# Patient Record
Sex: FEMALE | Race: WHITE | Employment: FULL TIME | URBAN - METROPOLITAN AREA
[De-identification: names, ages, dates, MRNs, and addresses within clinical notes are randomized per-mention and may not be internally consistent; named-entity substitution may affect disease eponyms.]

---

## 2019-08-23 ENCOUNTER — OFFICE VISIT (OUTPATIENT)
Dept: URGENT CARE | Facility: URGENT CARE | Age: 29
End: 2019-08-23
Payer: COMMERCIAL

## 2019-08-23 VITALS
SYSTOLIC BLOOD PRESSURE: 110 MMHG | RESPIRATION RATE: 16 BRPM | HEART RATE: 68 BPM | DIASTOLIC BLOOD PRESSURE: 70 MMHG | TEMPERATURE: 98.7 F | WEIGHT: 156.38 LBS

## 2019-08-23 DIAGNOSIS — M67.431 GANGLION CYST OF WRIST, RIGHT: Primary | ICD-10-CM

## 2019-08-23 PROCEDURE — 10060 I&D ABSCESS SIMPLE/SINGLE: CPT | Performed by: NURSE PRACTITIONER

## 2019-08-23 RX ORDER — NORGESTIMATE AND ETHINYL ESTRADIOL 7DAYSX3 LO
1 KIT ORAL
COMMUNITY
Start: 2019-05-09

## 2019-08-24 NOTE — PATIENT INSTRUCTIONS
Patient Education     Ganglion Cyst    A ganglion cyst usually is a painless bump on the wrist or finger joint. It connects to the joint capsule and grows like a balloon on a stalk. It is filled with joint fluid. The cause of a ganglion cyst is not known.   If the cyst puts pressure on a nearby nerve it may cause pain. Otherwise, cysts are painless and harmless. Most tend to disappear over time without treatment. Don't try to drain or break the cyst at home. This can cause harm and usually does not cure the problem.  If you are having pain from the cyst, a temporary wrist splint may be helpful to limit wrist motion. If this does not help, the fluid can be removed from the cyst. This should shrink the size of the cyst. If this doesn t give relief, the ganglion can be removed by surgery.  Home care    If you are having wrist pain, use a wrist splint for 1 to 2 weeks at a time. You can buy one at many drug stores without a prescription.    You may use over-the-counter pain medicine to control pain, unless another medicine was prescribed. If you have chronic liver or kidney disease or ever had a stomach ulcer or gastrointestinal bleeding, talk with your healthcare provider before using these medicines.      If a needle was used to drain the cyst fluid or inject medicine into it, keep the site clean and covered with a bandage for the first 24 hours. If a pressure dressing was applied, leave it in place for the time advised.  Follow-up care  Follow up with your healthcare provider, or as advised. Make an appointment for a repeat exam if pain continues for more than 2 weeks in a wrist splint.  When to seek medical advice  Call your healthcare provider right away if any of these occur:    Increasing pain in the wrist    Redness or warmth over the cyst    Fluid draining from the cyst    Numbness or tingling in the hand or arm  Date Last Reviewed: 5/1/2018 2000-2018 [employer]. [address] [facility],  [place] [address]. All rights reserved. This information is not intended as a substitute for professional medical care. Always follow your healthcare professional's instructions.           Patient Education     Quiste Ganglionar [Ganglion Cyst]    Un quiste ganglionar generalmente aparece kelli purnima protuberancia sin dolor en la patric (y algunas veces en las articulaciones de los dedos). Éste se conecta con la cápsula de la articulación y crece kelli un globo en un tallo. Se llena con líquido de la articulación. La causa del quiste de ganglio no se conoce.  Si el quiste presiona sobre un nervio cercano puede provocar dolor. En miya contrario, no duele ni hace daño. La mayoría tiende a desparecer con el tiempo sin ningún tratamiento. No intente drenar o reventar un quiste en quick casa. Tensed puede causar un daño y no soluciona el problema.  Si el quiste le está provocando dolor, purnima férula de patric temporal puede ayudarle para limitar el movimiento de la patric. Si esto no ayuda, se puede quitar el líquido del quiste haciendo que el mismo disminuya en tamaño. Si esto todavía no jimenez, el ganglio se puede remover con cirugía.  Cuidado En [place]  1. Si está con dolor el la patric pruebe a usar purnima muñequera de Velcro andrzej 1-2 semanas seguidas (disponible en cualquier farmacia sin receta).  2. Puede usar acetaminofén (Tylenol) o ibuprofeno (Motrin, Advil) para controlar el dolor, a menos que le receten otro medicamento para el dolor. [NOTA: Si sufre de enfermedad del hígado o riñones, o alguna vez tuvo purnima úlcera estomacal o sangrado gastrointestinal, hable con quick médico antes de usar estos medicamentos.]  3. Si se utilizó purnima aguja para drenar el líquido del quiste o inyectar un medicamento, mantenga el sitio limpio y cubierto por purnima curita andrzej las primeras 24 horas. Si se aplicó un vendaje de presión, déjeselo puesto andrzej el tiempo que le indicaron.  Seguimiento  con quick médico de acuerdo a lo indicado por  nuestro personal. Programe purnima marco antonio para exámenes de control si quick dolor continúa por más de 2 semanas aún usando purnima muñequera.  Busque Prontamente Atención Médica  si algo de lo siguiente ocurre:    Aumento en el dolor en la patric    Enrojecimiento del quiste    Líquido que sale del quiste    Admormecimiento u hormigueo en la mano o el brazo  Date Last Reviewed: 11/20/2015 2000-2018 [employer]. [address] [place] [id number]. Todos los derechos reservados. Esta información no pretende sustituir la atención médica profesional. Sólo quick médico puede diagnosticar y tratar un problema de beau.

## 2019-08-24 NOTE — PROGRESS NOTES
SUBJECTIVE  HPI:  lAisson Duckworth is a 28 year old female who presents with the CC of right wrist bump with associated pain.  This started in the beginning of August.  For the past 2 weeks the bump started growing and became more painful.  Denies fever, chills.  Wrist does feel slightly numb. No history of this in the past.  Otherwise feeling well.    No past medical history on file.  Current Outpatient Medications   Medication Sig Dispense Refill     norgestim-eth estrad triphasic (ORTHO TRI-CYCLEN LO) 0.18/0.215/0.25 MG-25 MCG tablet Take 1 tablet by mouth       Social History     Tobacco Use     Smoking status: Never Smoker     Smokeless tobacco: Never Used   Substance Use Topics     Alcohol use: Not on file       ROS:  Review of systems negative except as stated above.    OBJECTIVE:  /70 (Cuff Size: Adult Regular)   Pulse 68   Temp 98.7  F (37.1  C) (Oral)   Resp 16   Wt 70.9 kg (156 lb 6 oz)   GENERAL APPEARANCE: healthy, alert and no distress  ABDOMEN:  soft, nontender, no HSM or masses and bowel sounds normal  NEURO: Normal strength and tone, sensory exam grossly normal,  normal speech and mentation  SKIN: 1 cm ganglion cyst located on right palmar medial wrist, no erythema, tender to touch.  No drainage       Affected area cleaned with betadine x 3.  18g needle used to aspirate 0.5 ml of gelatinous fluid.  Gauze with pressure applied and bleeding stopped, patient felt pain relief. Appropriate wound care dressing applied.  Pt tolerated procedure well.    ASSESSMENT:  Ganglion cyst of right wrist    PLAN:  -needle aspiration done with relief of pain  -recommend she follow up with PCP  -return if worsening  -leave dressing on for 24 hours, then  remove      See Orders in Epic    Susan Ramírez, APRN, CNP

## 2019-11-10 ENCOUNTER — OFFICE VISIT (OUTPATIENT)
Dept: URGENT CARE | Facility: URGENT CARE | Age: 29
End: 2019-11-10
Payer: COMMERCIAL

## 2019-11-10 VITALS
HEART RATE: 74 BPM | RESPIRATION RATE: 20 BRPM | DIASTOLIC BLOOD PRESSURE: 46 MMHG | TEMPERATURE: 98.3 F | SYSTOLIC BLOOD PRESSURE: 120 MMHG | WEIGHT: 158 LBS

## 2019-11-10 DIAGNOSIS — R10.31 RIGHT LOWER QUADRANT PAIN: Primary | ICD-10-CM

## 2019-11-10 PROBLEM — H52.13 MYOPIA OF BOTH EYES WITH ASTIGMATISM: Status: ACTIVE | Noted: 2019-06-24

## 2019-11-10 PROBLEM — M67.431 GANGLION CYST OF WRIST, RIGHT: Status: ACTIVE | Noted: 2019-10-01

## 2019-11-10 PROBLEM — H04.123 DRY EYES: Status: ACTIVE | Noted: 2019-06-24

## 2019-11-10 PROBLEM — Z12.4 PAP SMEAR FOR CERVICAL CANCER SCREENING: Status: ACTIVE | Noted: 2018-12-10

## 2019-11-10 PROBLEM — H52.203 MYOPIA OF BOTH EYES WITH ASTIGMATISM: Status: ACTIVE | Noted: 2019-06-24

## 2019-11-10 PROCEDURE — 99213 OFFICE O/P EST LOW 20 MIN: CPT | Performed by: FAMILY MEDICINE

## 2019-11-10 NOTE — PROGRESS NOTES
Alisson Duckworth is a 29 year old female who reports 2  days of severe   abdominal pain 9 /10 intensity   with   symptoms that was worse today to the degree that he/ she is unable to do the usual home  Activities because she gets severe pain with walking  Pain is in RLQ and suprapubic area  No fevers/ chills/ nausea/ vomiting/ diarrhea.  Patient Slovenian speaking- interview in Slovenian    PMH-   section x 1  Has not had appendectomy or other abdominal surgeries    Patient Active Problem List   Diagnosis     Ganglion cyst of wrist, right     Dry eyes     GDM (gestational diabetes mellitus), class A1     H/O:      Helicobacter pylori antibody positive     Myopia of both eyes with astigmatism     Need for vaccination against hepatitis B virus     Pap smear for cervical cancer screening     Vitamin D deficiency       MEDs - ortho tricyclen lo     ALLERGIES:  Patient has no known allergies.     Review Of Systems      Respiratory: No shortness of breath, dyspnea on exertion     Cardiovascular: negative for, palpitations, tachycardia and chest pain  Genitourinary: negative for dysuria and hematuria  Back: negative for pain with back movement,  CVA pain       OBJECTIVE:  /46 (Cuff Size: Adult Regular)   Pulse 74   Temp 98.3  F (36.8  C) (Oral)   Resp 20   Wt 71.7 kg (158 lb)   LMP 10/25/2019   GENERAL APPEARANCE:  Alert, anxious and distressed  ABDOMEN: tenderness marked RUQ and suprapubic with palpation, percussion and with rebound tenderness  NEURO: Normal strength and tone,  normal speech and mentation  SKIN: no suspicious lesions or rashes        ASSESSMENT:  Right lower quadrant pain         We discussed the limited evaluation that can be performed in the Urgent care, the potential imaging that would be needed to determine the cause of the pain and the immediate emergency care that would be provided in the ED.  Patient decided to go to the ER for evaluation and will arrange his/her  own  transport to the ED.  Patient appears sufficiently stable that ambulance transport is not needed.-  He  will drive her  Will go to [facility]         Criss Reyna MD

## 2020-02-07 ENCOUNTER — OFFICE VISIT (OUTPATIENT)
Dept: URGENT CARE | Facility: URGENT CARE | Age: 30
End: 2020-02-07
Payer: COMMERCIAL

## 2020-02-07 VITALS
HEART RATE: 68 BPM | OXYGEN SATURATION: 100 % | SYSTOLIC BLOOD PRESSURE: 114 MMHG | RESPIRATION RATE: 16 BRPM | DIASTOLIC BLOOD PRESSURE: 74 MMHG | TEMPERATURE: 98.6 F | WEIGHT: 160 LBS

## 2020-02-07 DIAGNOSIS — B97.89 ACUTE VIRAL TONSILLITIS: ICD-10-CM

## 2020-02-07 DIAGNOSIS — R07.0 THROAT PAIN: Primary | ICD-10-CM

## 2020-02-07 DIAGNOSIS — J03.80 ACUTE VIRAL TONSILLITIS: ICD-10-CM

## 2020-02-07 LAB
DEPRECATED S PYO AG THROAT QL EIA: NORMAL
SPECIMEN SOURCE: NORMAL

## 2020-02-07 PROCEDURE — 87880 STREP A ASSAY W/OPTIC: CPT | Performed by: FAMILY MEDICINE

## 2020-02-07 PROCEDURE — 87081 CULTURE SCREEN ONLY: CPT | Performed by: NURSE PRACTITIONER

## 2020-02-07 PROCEDURE — 99203 OFFICE O/P NEW LOW 30 MIN: CPT | Performed by: NURSE PRACTITIONER

## 2020-02-07 NOTE — PATIENT INSTRUCTIONS
Patient Education     Cuando usted tiene dolor de garganta    El dolor de garganta puede ser muy molesto y puede tener muchas causas diferentes. Quick proveedor de atención médica colaborará con usted para determinar la causa de quick dolor de garganta y encontrar el tratamiento más adecuado para qiuck miya.   Cuáles son las causas del dolor de garganta?  Las siguientes cosas pueden causar o empeorar el dolor de garganta:    Los virus del catarro y de la gripe    Las bacterias    Los irritantes kelli el humo de tabaco o la polución en el aire    El reflujo ácido  Renate garganta yadiel  Las amígdalas se encuentran a ambos lados de la garganta, cerca de la base de la lengua. En ellas se acumulan los virus y las bacterias, y ayudan a combatir las infecciones. La garganta (faringe) es la vía por la que pasa el aire. La mucosidad de la cavidad nasal también baja por willi conducto.  Renate garganta inflamada  Las amígdalas y la faringe pueden inflamarse a consecuencia de los virus del catarro y de la gripe. El goteo postnasal (exceso de mucosidad procedente de la cavidad nasal) puede irritar la garganta y hacer que esta o las amígdalas se vuelvan más propensas a las infecciones por bacterias. La tonsilitis severa en los niños y en los adultos, si se emma sin tratar, puede causar la formación de bolsas de pus (abscesos) cerca de las amígdalas.  Quick evaluación  Un examen médico puede ser útil para determinar la causa de quick dolor de garganta y también puede ayudar a quick proveedor de atención médica a elegir el tratamiento más adecuado para usted. La evaluación podría incluir renate historia clínica, un examen médico y pruebas de diagnóstico.  Historia clínica  Quick proveedor de atención médica podría preguntarle lo siguiente:     Cuánto tiempo hace que tiene dolor de garganta y qué tratamiento le michael dado hasta ahora?     Tiene algún otro síntoma, kelli hung en el cuerpo, fiebre o tos?     Es recurrente (aparece periódicamente) quick dolor de  garganta? En benjie miya  con qué frecuencia lo tiene?  Cuántos días de escuela o trabajo tinoco perdido a consecuencia del dolor de garganta?     Tiene dificultad para comer o para tragar?     Le michael dicho alguna vez que ronca, o tiene usted otros trastornos del sueño?     Tiene mal aliento?    Al toser,  expulsa mucosidad con sabor desagradable?  Examen médico  Charbel willi examen, quick proveedor de atención médica le examinará los oídos, la nariz y la garganta para determinar si hay algún problema. También revisará si tiene hinchazón en el krysten y le auscultará el pecho.  Pruebas posibles  Quick proveedor de atención médica también podría hacerle las siguientes pruebas:    Renate muestra o exudado faríngeo para revisar si tiene bacterias tales kelli los estreptococos (bacterias que causan amigdalitis estreptocócica)    Un análisis de chelo para revisar si tiene mononucleosis (renate infección por virus)    Renate radiografía para revisar si tiene neumonía, especialmente si tiene tos  Tratamiento del dolor de garganta  El tratamiento depende de muchos factores.  Cuál es la causa más probable?  Se trata de un problema reciente o es algo que desaparece y aparece de nuevo? En muchos casos, lo mejor es tratar los síntomas, descansar y dejar que el problema se resuelva por sí mismo. Los antibióticos pueden ayudar a eliminar algunas infecciones bacterianas. Para los casos más graves de amigdalitis recurrente, es posible que deban sacarle las amígdalas.  Cómo aliviar los síntomas    No fume y evite el humo de segunda mano.    Para los niños, deles un spray para la garganta o helados de jugo (paletas heladas). Los adultos y los niños mayores pueden usar pastillas para la garganta.    Zabrina líquidos calientes para aliviar la garganta y ayudar a diluir la mucosidad. Evite las bebidas alcohólicas, las comidas picantes y las bebidas ácidas kelli el jugo de naranja, ya que pueden irritar más la garganta.    Charanjit gárgaras con agua tibia salada (1  cucharadita de sal disuelta en 8 onzas de agua tibia).    Use un humidificador para mantener la humedad en el aire y aliviar el resecamiento de la garganta.    [place] medicamentos contra el dolor disponibles sin receta, kelli el acetaminofén y el ibuprofeno. Use estos medicamentos siguiendo las indicaciones y sin exceder la dosis recomendada. No le dé aspirina a los niños.    Se necesitan antibióticos?  Si la causa de quick dolor de garganta es purnima infección bacteriana, los antibióticos pueden ayudarle a sanar más rápidamente y prevenir las complicaciones. Aunque la infección de garganta por estreptecocos es purnima nfección importante que es tratable, esta constituye tan solo entre el 5 al 15% de los hung de garganta en los adultos que buscan atención médica. La mayoría de los casos de dolor de garganta son causados por los virus del resfriado o de la gripe, y los antibióticos no son útiles para las infecciones virales. De hecho, el uso de antibióticos cuando no son necesarios puede producir bacterias que son más difíciles de destruir. Quick proveedor de atención médica le recetará antibióticos solamente si considera que estos medicamentos podrían ayudarlo.  Si le recetan antibióticos  [place] los medicamentos exactamente según las indicaciones. Asegúrese de terminar toda la cantidad que le hayan indicado, aun cuando se sienta mejor. Asimismo, consulte con quick proveedor de atención médica o con quick farmacéutico para averiguar cuáles son los efectos secundarios comunes y lo que debe hacer si se le presentan.   Necesita cirugía?  En algunos casos puede ser necesario extraer las amígdalas. [place] suele hacerse kelli paciente ambulatorio o externo (el paciente regresa a casa el mismo día de la operación). Quick proveedor de atención médica podría aconsejarle la extracción de las amígdalas en los siguientes casos:    Ha tenido varios episodios graves de amigdalitis en el transcurso de un año. Se consideran episodios  graves  aquellos que le  hacen perder curtis de escuela o de trabajo, o que deben tratarse con antibióticos.    Amigdalitis que causa problemas respiratorios andrzej el sueño.    Amigdalitis causada por partículas de comida que se acumulan en las bolsas o criptas de las amígdalas (amigdalitis críptica).  Llame a quick proveedor de atención médica en cualquiera de los siguientes casos:    Le empeoran los síntomas o le aparecen síntomas nuevos.    Inflamación de las amígdalas que dificulta la respiración.    El dolor es tan aggie que le impide beber líquidos.    Tiene purnima erupción en la piel, urticaria o sibilancias al respirar; cualquiera de estas señales podría indicar purnima reacción alérgica a los antibióticos.    Kourtney síntomas no mejoran en el transcurso de purnima semana.    Kourtney síntomas no mejoran en el transcurso de 2 o 3 días desde que empieza a ned antibióticos.   Date Last Reviewed: 10/1/2016    [id number] [employer]. [address] [place] [address], [place] [address]. Todos los derechos reservados. Esta información no pretende sustituir la atención médica profesional. Sólo quick médico puede diagnosticar y tratar un problema de beau.

## 2020-02-07 NOTE — PROGRESS NOTES
SUBJECTIVE:   Alisson Duckworth is a 29 year old female presenting with a chief complaint of   Chief Complaint   Patient presents with     URI     cough green sputum,, throat pain, rt ear pain x3 days       She is a new patient of [place].    URI Adult    Onset of symptoms was 6 day(s) ago.  Course of illness is waxing and waning.    Severity moderate  Current and Associated symptoms: cough - productive, ear pain right, sore throat and headache  Treatment measures tried include Tylenol/Ibuprofen.  Predisposing factors include None.      Review of Systems   Constitutional, eye, ENT, skin, respiratory, cardiac, GI, , neuro, and allergy are normal except as otherwise noted.     No past medical history on file.  No family history on file.  Current Outpatient Medications   Medication Sig Dispense Refill     norgestim-eth estrad triphasic (ORTHO TRI-CYCLEN LO) 0.18/0.215/0.25 MG-25 MCG tablet Take 1 tablet by mouth       Social History     Tobacco Use     Smoking status: Never Smoker     Smokeless tobacco: Never Used   Substance Use Topics     Alcohol use: Not on file       OBJECTIVE  /74   Pulse 68   Temp 98.6  F (37  C) (Oral)   Resp 16   Wt 72.6 kg (160 lb)   SpO2 100%     Physical Exam  GENERAL: mildly ill appearing, alert and in no distress  EYES: Eyes grossly normal to inspection, no discharge.    HENT: Normocephalic, ear canals- normal; TMs- normal; No sinus tenderness Nose- normal with clear rhinnorhea; oropharynx with mild erythema and tonsils 2+ bilaterally, no exudates, Mouth- no ulcers, no lesions and mucous membranes moist  NECK: no tenderness, mild bilateral anterior cervical adenopathy, no asymmetry, no masses, no stiffness  RESP: lungs clear to auscultation - no rales, no rhonchi, no wheezes  CV: regular rates and rhythm, normal S1 S2, no S3 or S4 and no murmur, no click or rub  ABDOMEN: soft, no tenderness   MS: extremities- no gross deformities noted, no edema  SKIN: no suspicious  lesions, no rashes, good skin turgor  NEURO: strength and tone- normal, sensory exam- grossly normal, mentation appears normal   daughter translating for her all questions answered  Labs:  Results for orders placed or performed in visit on 02/07/20 (from the past 24 hour(s))   Rapid strep screen   Result Value Ref Range    Specimen Description Throat     Rapid Strep A Screen       NEGATIVE: No Group A streptococcal antigen detected by immunoassay, await culture report.       X-Ray was not done.    ASSESSMENT/PLAN:      ICD-10-CM    1. Throat pain R07.0 Rapid strep screen   Discussed viral versus bacterial illnesses along with typical course of progression, and no signs or symptoms of bacterial infection at this point. Culture pending   Symptom management: gargle salt water, honey in tea or warm water, plenty of rest and extra fluids. Reviewed signs of dehydration when to seek care     Followup:    If not improving or if condition worsens, follow up with your Primary Care Provider    There are no Patient Instructions on file for this visit.     Aide Rojas APRN CNP

## 2020-02-08 LAB
BACTERIA SPEC CULT: NORMAL
SPECIMEN SOURCE: NORMAL